# Patient Record
Sex: MALE | Race: WHITE
[De-identification: names, ages, dates, MRNs, and addresses within clinical notes are randomized per-mention and may not be internally consistent; named-entity substitution may affect disease eponyms.]

---

## 2018-04-25 ENCOUNTER — HOSPITAL ENCOUNTER (EMERGENCY)
Dept: HOSPITAL 25 - ED | Age: 38
Discharge: HOME | End: 2018-04-25
Payer: COMMERCIAL

## 2018-04-25 VITALS — SYSTOLIC BLOOD PRESSURE: 120 MMHG | DIASTOLIC BLOOD PRESSURE: 68 MMHG

## 2018-04-25 DIAGNOSIS — H92.01: ICD-10-CM

## 2018-04-25 DIAGNOSIS — K11.20: Primary | ICD-10-CM

## 2018-04-25 DIAGNOSIS — R68.84: ICD-10-CM

## 2018-04-25 LAB
BASOPHILS # BLD AUTO: 0.1 10^3/UL (ref 0–0.2)
EOSINOPHIL # BLD AUTO: 0.1 10^3/UL (ref 0–0.6)
HCT VFR BLD AUTO: 40 % (ref 42–52)
HGB BLD-MCNC: 13.8 G/DL (ref 14–18)
LYMPHOCYTES # BLD AUTO: 1.8 10^3/UL (ref 1–4.8)
MCH RBC QN AUTO: 31 PG (ref 27–31)
MCHC RBC AUTO-ENTMCNC: 34 G/DL (ref 31–36)
MCV RBC AUTO: 89 FL (ref 80–94)
MONOCYTES # BLD AUTO: 0.7 10^3/UL (ref 0–0.8)
NEUTROPHILS # BLD AUTO: 5 10^3/UL (ref 1.5–7.7)
NRBC # BLD AUTO: 0 10^3/UL
NRBC BLD QL AUTO: 0
PLATELET # BLD AUTO: 335 10^3/UL (ref 150–450)
RBC # BLD AUTO: 4.53 10^6/UL (ref 4–5.4)
WBC # BLD AUTO: 7.6 10^3/UL (ref 3.5–10.8)

## 2018-04-25 PROCEDURE — 36415 COLL VENOUS BLD VENIPUNCTURE: CPT

## 2018-04-25 PROCEDURE — 99282 EMERGENCY DEPT VISIT SF MDM: CPT

## 2018-04-25 PROCEDURE — 80048 BASIC METABOLIC PNL TOTAL CA: CPT

## 2018-04-25 PROCEDURE — 86140 C-REACTIVE PROTEIN: CPT

## 2018-04-25 PROCEDURE — 83605 ASSAY OF LACTIC ACID: CPT

## 2018-04-25 PROCEDURE — 85025 COMPLETE CBC W/AUTO DIFF WBC: CPT

## 2018-04-25 PROCEDURE — 70491 CT SOFT TISSUE NECK W/DYE: CPT

## 2018-04-25 NOTE — ED
Eitan HASSAN Jennifer, scribed for Tommy Mcdonald MD on 04/25/18 at 0443 .





Throat Pain/Nasal Congestion





- HPI Summary


HPI Summary: 





The patient is a 37 year old male who presents with right sided facial swelling 

for one day. The patient describes it started out as a small bump yesterday but 

got bigger this morning. He went to see his PCP yesterday at 14:00, but it has 

doubled in size since then. The patient additionally complain of right ear pain 

and right jaw aching. 





- History of Current Complaint


Chief Complaint: EDFacialInjury


Time Seen by Provider: 04/25/18 04:18


Hx Obtained From: Patient


Onset/Duration: Sudden Onset, Lasting Days - 1 day, Still Present, Worse Since 

- 14:00 yesterday


Severity: Moderate


Cough: None





- Allergies/Home Medications


Allergies/Adverse Reactions: 


 Allergies











Allergy/AdvReac Type Severity Reaction Status Date / Time


 


No Known Allergies Allergy   Verified 04/01/14 06:28














PMH/Surg Hx/FS Hx/Imm Hx


Endocrine/Hematology History: 


   Denies: Hx Diabetes


Cardiovascular History: 


   Denies: Hx Hypertension, Hx Pacemaker/ICD


Sensory History: Reports: Hx Contacts or Glasses - WILL WEAR GLASSES DAY OF 

SURGERY


   Denies: Hx Hearing Aid


Opthamlomology History: Reports: Hx Contacts or Glasses - WILL WEAR GLASSES DAY 

OF SURGERY


Psychiatric History: 


   Denies: Hx Panic Disorder





- Surgical History


Surgery Procedure, Year, and Place: RT KNEE SURGERY


Hx Anesthesia Reactions: No


Infectious Disease History: No


Infectious Disease History: 


   Denies: Traveled Outside the [place] in Last 30 Days





- Family History


Known Family History: 


   Negative: Diabetes





- Social History


Alcohol Use: Occasionally


Substance Use Type: Reports: None


Hx Tobacco Use: No


Smoking Status (MU): Never Smoked Tobacco





Review of Systems


ENT: Other - jaw pain


Positive: Ear Ache


Positive: Other - right face swelling


All Other Systems Reviewed And Are Negative: Yes





Physical Exam





- Summary


Physical Exam Summary: 





Appearance: Well appearing, no pain distress


Skin: warm, dry, reflects adequate perfusion


Head/face: normal


Eyes: EOMI, GUI


ENT: preauricular firm mass, tenderness without adjacent adenopathy. no real 

warmth to it. No gingival tenderness or swelling. dentition looks normal. 

Otherwise normal. Moist mucous membranes.


Neck: supple, non-tender


Respiratory: CTA, breath sounds present


Cardiovascular: RRR, pulses symmetrical 


Abdomen: non-tender, soft


Bowel Sounds: present


Musculoskeletal: normal, strength/ROM intact


Neuro: normal, sensory motor intact, A&Ox3


Triage Information Reviewed: Yes


Vital Signs On Initial Exam: 


 Initial Vitals











Temp Pulse Resp BP Pulse Ox


 


 97.8 F   65   16   128/66   100 


 


 04/25/18 04:18  04/25/18 04:18  04/25/18 04:18  04/25/18 04:18  04/25/18 04:18











Vital Signs Reviewed: Yes





Diagnostics





- Vital Signs


 Vital Signs











  Temp Pulse Resp BP Pulse Ox


 


 04/25/18 04:18  97.8 F  65  16  128/66  100














- Laboratory


Lab Results: 


 Lab Results











  04/25/18 04/25/18 04/25/18 Range/Units





  04:49 04:49 04:49 


 


WBC   7.6   (3.5-10.8)  10^3/ul


 


RBC   4.53   (4.0-5.4)  10^6/ul


 


Hgb   13.8 L   (14.0-18.0)  g/dl


 


Hct   40 L   (42-52)  %


 


MCV   89   (80-94)  fL


 


MCH   31   (27-31)  pg


 


MCHC   34   (31-36)  g/dl


 


RDW   13   (10.5-15)  %


 


Plt Count   335   (150-450)  10^3/ul


 


MPV   6.7 L   (7.4-10.4)  um3


 


Neut % (Auto)   65.5   (38-83)  %


 


Lymph % (Auto)   23.1 L   (25-47)  %


 


Mono % (Auto)   9.4 H   (0-7)  %


 


Eos % (Auto)   1.3   (0-6)  %


 


Baso % (Auto)   0.7   (0-2)  %


 


Absolute Neuts (auto)   5.0   (1.5-7.7)  10^3/ul


 


Absolute Lymphs (auto)   1.8   (1.0-4.8)  10^3/ul


 


Absolute Monos (auto)   0.7   (0-0.8)  10^3/ul


 


Absolute Eos (auto)   0.1   (0-0.6)  10^3/ul


 


Absolute Basos (auto)   0.1   (0-0.2)  10^3/ul


 


Absolute Nucleated RBC   0   10^3/ul


 


Nucleated RBC %   0   


 


Sodium  138 L    (139-145)  mmol/L


 


Potassium  4.1    (3.5-5.0)  mmol/L


 


Chloride  107    (101-111)  mmol/L


 


Carbon Dioxide  27    (22-32)  mmol/L


 


Anion Gap  4    (2-11)  mmol/L


 


BUN  20    (6-24)  mg/dL


 


Creatinine  0.79    (0.67-1.17)  mg/dL


 


Est GFR ( Amer)  140.4    (>60)  


 


Est GFR (Non-Af Amer)  109.2    (>60)  


 


BUN/Creatinine Ratio  25.3 H    (8-20)  


 


Glucose  105 H    ()  mg/dL


 


Lactic Acid    0.9  (0.5-2.0)  mmol/L


 


Calcium  9.0    (8.6-10.3)  mg/dL


 


C-Reactive Protein  17.27 H    (< 5.00)  mg/L











Result Diagrams: 


 04/25/18 04:49





 04/25/18 04:49


Lab Statement: Any lab studies that have been ordered have been reviewed, and 

results considered in the medical decision making process.





- CT


  ** CT Neck


CT Interpretation: No Acute Changes - The visualized intracranial and orbital 

contents are normal. There is edema in the right neck involving the parotid 

gland may represent cellulitis and/or infection the fragment. There are no 

stones or duct citation. No abscess. Mastoid air cells and middle ear's are 

patent bilaterally. IMPRESSION: Right facial cellulitis and/or parotitis 

without stone, duct dilation or abscess.


CT Interpretation Completed By: Radiologist





Re-Evaluation





- Re-Evaluation


  ** First Eval


Re-Evaluation Time: 06:46


Change: Unchanged





EENT Course/Dx





- Course


Course Of Treatment: Patient with preauricular swelling and tenderness with 

possible parotid gland involvement versus adenopathy versus sialoadenitis.  CT 

scan confirms parotitis.  IV clindamycin was given.  No fever or elevated white 

blood cell count.  Discussed with ENT who will follow him up today in the 

office.  Encouraged to do warm compresses to the area.  Patient is not ill-

appearing.





- Differential Diagnoses


Differential Diagnoses: Damion's Angina, Odontogenic Pain, Other - Sialoadenitis

, parotitis, mumps, dental abscess





- Diagnoses


Provider Diagnoses: 


 Parotitis








- Provider Notifications


Discussed Care Of Patient With: Jonathan Cryer


Time Discussed With Above Provider: 06:35


Instructed by Provider To: Other - Dr. Cryer will see patient in his office 

today at 08:30.





Discharge





- Sign-Out/Discharge


Documenting (check all that apply): Discharge/Admit/Transfer





- Discharge Plan


Condition: Good


Disposition: HOME


Prescriptions: 


Clindamycin Cap(NF) [Clindamycin Cap 300 mg Cap(NF)] 300 mg PO Q6H #28 cap


Forms:  *Work Release


Referrals: 


Cryer,Jonathan, MD [Medical Doctor] - 


Additional Instructions: 


You have parotitis.





Call the office of Dr. Cryer at 8:30 this morning and be prepared to be seen in 

the office today.  Warm compresses to the area.  Stay well hydrated.  Sour 

candies may help.





Return with fever, vomiting, worse or other concerns.





- Billing Disposition and Condition


Condition: GOOD


Disposition: HOME





The documentation as recorded by the Eitan patton Jennifer accurately reflects 

the service I personally performed and the decisions made by me, Tommy Mcdonald MD.

## 2018-04-25 NOTE — RAD
INDICATION:  2 days of left facial swelling



COMPARISON: None



TECHNIQUE: A CT scan of the neck was performed with intravenous contrast following

intravenous injection of 50 ml of Omnipaque 300 nonionic contrast. Contiguous axial

sections were obtained from the skull base through the lung apices. Images were

reconstructed in the coronal and sagittal planes.



FINDINGS: 



The airway is patent. The epiglottis and aryepiglottic folds appear within normal limits.

No retropharyngeal soft tissue swelling is noted.



No significant enlarged nodes are seen.



There is enlargement and heterogeneity of the attenuation of the right parotid gland

relative to the left. Overlying the parotid gland there is infiltration of the

subcutaneous fat. No focal masses or hyperattenuating stones are visualized. 



The submandibular glands appear to be within normal limits. The thyroid gland appears

normal.



The lung apices appear clear.



The visualized portion of the paranasal sinuses and mastoid air cells appear clear.



No significant focal osseous abnormality is seen.



IMPRESSION: 

 CT findings are consistent with right parotiditis without a readily apparent etiology.